# Patient Record
Sex: FEMALE | Race: WHITE | NOT HISPANIC OR LATINO
[De-identification: names, ages, dates, MRNs, and addresses within clinical notes are randomized per-mention and may not be internally consistent; named-entity substitution may affect disease eponyms.]

---

## 2018-06-03 ENCOUNTER — TRANSCRIPTION ENCOUNTER (OUTPATIENT)
Age: 53
End: 2018-06-03

## 2018-07-05 ENCOUNTER — TRANSCRIPTION ENCOUNTER (OUTPATIENT)
Age: 53
End: 2018-07-05

## 2018-10-19 ENCOUNTER — TRANSCRIPTION ENCOUNTER (OUTPATIENT)
Age: 53
End: 2018-10-19

## 2018-10-24 ENCOUNTER — TRANSCRIPTION ENCOUNTER (OUTPATIENT)
Age: 53
End: 2018-10-24

## 2018-12-08 ENCOUNTER — TRANSCRIPTION ENCOUNTER (OUTPATIENT)
Age: 53
End: 2018-12-08

## 2018-12-20 ENCOUNTER — TRANSCRIPTION ENCOUNTER (OUTPATIENT)
Age: 53
End: 2018-12-20

## 2019-01-25 ENCOUNTER — TRANSCRIPTION ENCOUNTER (OUTPATIENT)
Age: 54
End: 2019-01-25

## 2019-01-25 ENCOUNTER — NON-APPOINTMENT (OUTPATIENT)
Age: 54
End: 2019-01-25

## 2019-01-25 ENCOUNTER — APPOINTMENT (OUTPATIENT)
Dept: INTERNAL MEDICINE | Facility: CLINIC | Age: 54
End: 2019-01-25
Payer: COMMERCIAL

## 2019-01-25 VITALS
OXYGEN SATURATION: 99 % | HEIGHT: 61 IN | SYSTOLIC BLOOD PRESSURE: 102 MMHG | HEART RATE: 66 BPM | RESPIRATION RATE: 14 BRPM | TEMPERATURE: 97.6 F | WEIGHT: 125 LBS | BODY MASS INDEX: 23.6 KG/M2 | DIASTOLIC BLOOD PRESSURE: 64 MMHG

## 2019-01-25 PROCEDURE — 93000 ELECTROCARDIOGRAM COMPLETE: CPT

## 2019-01-25 PROCEDURE — 99386 PREV VISIT NEW AGE 40-64: CPT | Mod: 25

## 2019-01-25 NOTE — PHYSICAL EXAM

## 2019-01-25 NOTE — HEALTH RISK ASSESSMENT
[Very Good] : ~his/her~  mood as very good [] : No [No falls in past year] : Patient reported no falls in the past year [0] : 2) Feeling down, depressed, or hopeless: Not at all (0) [Patient reported mammogram was normal] : Patient reported mammogram was normal [Patient reported colonoscopy was normal] : Patient reported colonoscopy was normal [MammogramDate] : 08/17 [ColonoscopyDate] : 01/19

## 2019-01-29 LAB
25(OH)D3 SERPL-MCNC: 22.3 NG/ML
ALBUMIN SERPL ELPH-MCNC: 4.7 G/DL
ALP BLD-CCNC: 90 U/L
ALT SERPL-CCNC: 173 U/L
ANION GAP SERPL CALC-SCNC: 11 MMOL/L
APPEARANCE: CLEAR
AST SERPL-CCNC: 96 U/L
BACTERIA: NEGATIVE
BASOPHILS # BLD AUTO: 0.03 K/UL
BASOPHILS NFR BLD AUTO: 0.6 %
BILIRUB SERPL-MCNC: 0.5 MG/DL
BILIRUBIN URINE: NEGATIVE
BLOOD URINE: ABNORMAL
BUN SERPL-MCNC: 26 MG/DL
CALCIUM SERPL-MCNC: 9.7 MG/DL
CHLORIDE SERPL-SCNC: 103 MMOL/L
CHOLEST SERPL-MCNC: 219 MG/DL
CHOLEST/HDLC SERPL: 3.6 RATIO
CO2 SERPL-SCNC: 26 MMOL/L
COLOR: YELLOW
CREAT SERPL-MCNC: 1.01 MG/DL
EOSINOPHIL # BLD AUTO: 0.17 K/UL
EOSINOPHIL NFR BLD AUTO: 3.2 %
FOLATE SERPL-MCNC: 14.7 NG/ML
GLUCOSE QUALITATIVE U: NEGATIVE MG/DL
GLUCOSE SERPL-MCNC: 84 MG/DL
HBA1C MFR BLD HPLC: 5.2 %
HCT VFR BLD CALC: 42.9 %
HDLC SERPL-MCNC: 61 MG/DL
HGB BLD-MCNC: 14 G/DL
IMM GRANULOCYTES NFR BLD AUTO: 0.2 %
KETONES URINE: NEGATIVE
LDLC SERPL CALC-MCNC: 147 MG/DL
LEUKOCYTE ESTERASE URINE: NEGATIVE
LYMPHOCYTES # BLD AUTO: 1.75 K/UL
LYMPHOCYTES NFR BLD AUTO: 33.4 %
MAN DIFF?: NORMAL
MCHC RBC-ENTMCNC: 29.9 PG
MCHC RBC-ENTMCNC: 32.6 GM/DL
MCV RBC AUTO: 91.5 FL
MICROSCOPIC-UA: NORMAL
MONOCYTES # BLD AUTO: 0.57 K/UL
MONOCYTES NFR BLD AUTO: 10.9 %
NEUTROPHILS # BLD AUTO: 2.71 K/UL
NEUTROPHILS NFR BLD AUTO: 51.7 %
NITRITE URINE: NEGATIVE
PH URINE: 5.5
PLATELET # BLD AUTO: 243 K/UL
POTASSIUM SERPL-SCNC: 4.7 MMOL/L
PROT SERPL-MCNC: 7.5 G/DL
PROTEIN URINE: NEGATIVE MG/DL
RBC # BLD: 4.69 M/UL
RBC # FLD: 13.4 %
RED BLOOD CELLS URINE: 8 /HPF
SODIUM SERPL-SCNC: 140 MMOL/L
SPECIFIC GRAVITY URINE: 1.02
SQUAMOUS EPITHELIAL CELLS: 1 /HPF
TRIGL SERPL-MCNC: 57 MG/DL
TSH SERPL-ACNC: 3.18 UIU/ML
UROBILINOGEN URINE: NEGATIVE MG/DL
VIT B12 SERPL-MCNC: 604 PG/ML
WBC # FLD AUTO: 5.24 K/UL
WHITE BLOOD CELLS URINE: 1 /HPF

## 2019-01-30 ENCOUNTER — FORM ENCOUNTER (OUTPATIENT)
Age: 54
End: 2019-01-30

## 2019-01-31 ENCOUNTER — APPOINTMENT (OUTPATIENT)
Dept: ULTRASOUND IMAGING | Facility: CLINIC | Age: 54
End: 2019-01-31

## 2019-01-31 ENCOUNTER — OUTPATIENT (OUTPATIENT)
Dept: OUTPATIENT SERVICES | Facility: HOSPITAL | Age: 54
LOS: 1 days | End: 2019-01-31
Payer: COMMERCIAL

## 2019-01-31 DIAGNOSIS — Z00.8 ENCOUNTER FOR OTHER GENERAL EXAMINATION: ICD-10-CM

## 2019-01-31 LAB
ALBUMIN SERPL ELPH-MCNC: 4.3 G/DL
ALP BLD-CCNC: 82 U/L
ALT SERPL-CCNC: 132 U/L
ANION GAP SERPL CALC-SCNC: 11 MMOL/L
APPEARANCE: CLEAR
AST SERPL-CCNC: 67 U/L
BILIRUB SERPL-MCNC: 0.5 MG/DL
BILIRUBIN URINE: NEGATIVE
BLOOD URINE: NEGATIVE
BUN SERPL-MCNC: 19 MG/DL
CALCIUM SERPL-MCNC: 9.5 MG/DL
CHLORIDE SERPL-SCNC: 101 MMOL/L
CO2 SERPL-SCNC: 27 MMOL/L
COLOR: YELLOW
CREAT SERPL-MCNC: 0.8 MG/DL
GLUCOSE QUALITATIVE U: NEGATIVE MG/DL
GLUCOSE SERPL-MCNC: 93 MG/DL
HAV IGM SER QL: NONREACTIVE
HBV CORE IGM SER QL: NONREACTIVE
HBV SURFACE AG SER QL: NONREACTIVE
HCV AB SER QL: NONREACTIVE
HCV S/CO RATIO: 0.09 S/CO
KETONES URINE: NEGATIVE
LEUKOCYTE ESTERASE URINE: NEGATIVE
NITRITE URINE: NEGATIVE
PH URINE: 7
POTASSIUM SERPL-SCNC: 4.6 MMOL/L
PROT SERPL-MCNC: 7.2 G/DL
PROTEIN URINE: NEGATIVE MG/DL
SODIUM SERPL-SCNC: 139 MMOL/L
SPECIFIC GRAVITY URINE: 1.01
UROBILINOGEN URINE: NEGATIVE MG/DL

## 2019-01-31 PROCEDURE — 76700 US EXAM ABDOM COMPLETE: CPT | Mod: 26

## 2019-01-31 PROCEDURE — 76700 US EXAM ABDOM COMPLETE: CPT

## 2019-02-05 LAB — SMOOTH MUSCLE AB SER QL IF: ABNORMAL

## 2019-02-06 LAB
ANA PAT FLD IF-IMP: ABNORMAL
ANA SER IF-ACNC: ABNORMAL

## 2019-02-27 ENCOUNTER — APPOINTMENT (OUTPATIENT)
Dept: HEPATOLOGY | Facility: CLINIC | Age: 54
End: 2019-02-27

## 2019-03-05 ENCOUNTER — TRANSCRIPTION ENCOUNTER (OUTPATIENT)
Age: 54
End: 2019-03-05

## 2019-05-28 ENCOUNTER — APPOINTMENT (OUTPATIENT)
Dept: INTERNAL MEDICINE | Facility: CLINIC | Age: 54
End: 2019-05-28
Payer: COMMERCIAL

## 2019-05-28 ENCOUNTER — LABORATORY RESULT (OUTPATIENT)
Age: 54
End: 2019-05-28

## 2019-05-28 VITALS
RESPIRATION RATE: 14 BRPM | SYSTOLIC BLOOD PRESSURE: 100 MMHG | WEIGHT: 125 LBS | OXYGEN SATURATION: 99 % | DIASTOLIC BLOOD PRESSURE: 66 MMHG | BODY MASS INDEX: 23.6 KG/M2 | HEIGHT: 61 IN | TEMPERATURE: 98.1 F | HEART RATE: 52 BPM

## 2019-05-28 DIAGNOSIS — Z87.440 PERSONAL HISTORY OF URINARY (TRACT) INFECTIONS: ICD-10-CM

## 2019-05-28 LAB
ALBUMIN SERPL ELPH-MCNC: 4.4 G/DL
ALP BLD-CCNC: 104 U/L
ALT SERPL-CCNC: 24 U/L
ANION GAP SERPL CALC-SCNC: 8 MMOL/L
AST SERPL-CCNC: 15 U/L
BILIRUB SERPL-MCNC: 0.4 MG/DL
BILIRUB UR QL STRIP: NORMAL
BUN SERPL-MCNC: 14 MG/DL
CALCIUM SERPL-MCNC: 9.3 MG/DL
CHLORIDE SERPL-SCNC: 105 MMOL/L
CO2 SERPL-SCNC: 29 MMOL/L
CREAT SERPL-MCNC: 0.81 MG/DL
GLUCOSE SERPL-MCNC: 93 MG/DL
GLUCOSE UR-MCNC: NORMAL
HCG UR QL: 0.2 EU/DL
HGB UR QL STRIP.AUTO: NORMAL
KETONES UR-MCNC: NORMAL
LEUKOCYTE ESTERASE UR QL STRIP: NORMAL
NITRITE UR QL STRIP: NORMAL
PH UR STRIP: 6
POTASSIUM SERPL-SCNC: 5.2 MMOL/L
PROT SERPL-MCNC: 6.5 G/DL
PROT UR STRIP-MCNC: NORMAL
SODIUM SERPL-SCNC: 142 MMOL/L
SP GR UR STRIP: 1.01
TSH SERPL-ACNC: 1.85 UIU/ML

## 2019-05-28 PROCEDURE — 99214 OFFICE O/P EST MOD 30 MIN: CPT | Mod: 25

## 2019-05-28 PROCEDURE — 81003 URINALYSIS AUTO W/O SCOPE: CPT | Mod: QW

## 2019-05-28 NOTE — HISTORY OF PRESENT ILLNESS
[de-identified] : Pt here today for UTI symptoms\par Pt with urgency, frequency and odor in the urine\par Started 2 days ago.

## 2019-05-29 LAB
APPEARANCE: CLEAR
BILIRUBIN URINE: NEGATIVE
BLOOD URINE: NORMAL
COLOR: COLORLESS
GLUCOSE QUALITATIVE U: NEGATIVE
KETONES URINE: NEGATIVE
LEUKOCYTE ESTERASE URINE: ABNORMAL
NITRITE URINE: NEGATIVE
PH URINE: 6.5
PROTEIN URINE: NEGATIVE
SPECIFIC GRAVITY URINE: 1.01
UROBILINOGEN URINE: NORMAL

## 2019-12-18 ENCOUNTER — TRANSCRIPTION ENCOUNTER (OUTPATIENT)
Age: 54
End: 2019-12-18

## 2020-03-08 ENCOUNTER — TRANSCRIPTION ENCOUNTER (OUTPATIENT)
Age: 55
End: 2020-03-08

## 2020-07-08 ENCOUNTER — NON-APPOINTMENT (OUTPATIENT)
Age: 55
End: 2020-07-08

## 2020-07-08 ENCOUNTER — APPOINTMENT (OUTPATIENT)
Dept: INTERNAL MEDICINE | Facility: CLINIC | Age: 55
End: 2020-07-08
Payer: COMMERCIAL

## 2020-07-08 VITALS
TEMPERATURE: 98.6 F | OXYGEN SATURATION: 98 % | WEIGHT: 124 LBS | DIASTOLIC BLOOD PRESSURE: 76 MMHG | BODY MASS INDEX: 23.43 KG/M2 | HEART RATE: 57 BPM | SYSTOLIC BLOOD PRESSURE: 118 MMHG | RESPIRATION RATE: 14 BRPM

## 2020-07-08 PROCEDURE — 99396 PREV VISIT EST AGE 40-64: CPT | Mod: 25

## 2020-07-08 PROCEDURE — 93000 ELECTROCARDIOGRAM COMPLETE: CPT

## 2020-07-08 PROCEDURE — 36415 COLL VENOUS BLD VENIPUNCTURE: CPT

## 2020-07-08 RX ORDER — SULFAMETHOXAZOLE AND TRIMETHOPRIM 800; 160 MG/1; MG/1
800-160 TABLET ORAL
Qty: 20 | Refills: 0 | Status: DISCONTINUED | COMMUNITY
Start: 2018-12-20 | End: 2020-07-08

## 2020-07-08 RX ORDER — CEFUROXIME AXETIL 250 MG/1
250 TABLET ORAL
Qty: 10 | Refills: 0 | Status: DISCONTINUED | COMMUNITY
Start: 2019-05-28 | End: 2020-07-08

## 2020-07-08 RX ORDER — NITROFURANTOIN (MONOHYDRATE/MACROCRYSTALS) 25; 75 MG/1; MG/1
100 CAPSULE ORAL
Qty: 14 | Refills: 0 | Status: DISCONTINUED | COMMUNITY
Start: 2018-12-25 | End: 2020-07-08

## 2020-07-08 RX ORDER — ESCITALOPRAM OXALATE 5 MG/1
5 TABLET ORAL
Qty: 30 | Refills: 0 | Status: DISCONTINUED | COMMUNITY
Start: 2019-05-22 | End: 2020-07-08

## 2020-07-08 NOTE — PHYSICAL EXAM
[No Acute Distress] : no acute distress [Well Nourished] : well nourished [Well Developed] : well developed [Well-Appearing] : well-appearing [EOMI] : extraocular movements intact [Normal Sclera/Conjunctiva] : normal sclera/conjunctiva [PERRL] : pupils equal round and reactive to light [No JVD] : no jugular venous distention [Normal Oropharynx] : the oropharynx was normal [Normal Outer Ear/Nose] : the outer ears and nose were normal in appearance [No Lymphadenopathy] : no lymphadenopathy [Supple] : supple [No Respiratory Distress] : no respiratory distress  [No Accessory Muscle Use] : no accessory muscle use [Thyroid Normal, No Nodules] : the thyroid was normal and there were no nodules present [Normal Rate] : normal rate  [Clear to Auscultation] : lungs were clear to auscultation bilaterally [Normal S1, S2] : normal S1 and S2 [No Murmur] : no murmur heard [Regular Rhythm] : with a regular rhythm [No Carotid Bruits] : no carotid bruits [No Abdominal Bruit] : a ~M bruit was not heard ~T in the abdomen [No Varicosities] : no varicosities [Pedal Pulses Present] : the pedal pulses are present [No Extremity Clubbing/Cyanosis] : no extremity clubbing/cyanosis [No Edema] : there was no peripheral edema [No Palpable Aorta] : no palpable aorta [Soft] : abdomen soft [Non Tender] : non-tender [Non-distended] : non-distended [No Masses] : no abdominal mass palpated [No HSM] : no HSM [Normal Bowel Sounds] : normal bowel sounds [Normal Posterior Cervical Nodes] : no posterior cervical lymphadenopathy [Normal Anterior Cervical Nodes] : no anterior cervical lymphadenopathy [No Spinal Tenderness] : no spinal tenderness [No CVA Tenderness] : no CVA  tenderness [No Rash] : no rash [Grossly Normal Strength/Tone] : grossly normal strength/tone [No Joint Swelling] : no joint swelling [No Focal Deficits] : no focal deficits [Coordination Grossly Intact] : coordination grossly intact [Normal Gait] : normal gait [Normal Insight/Judgement] : insight and judgment were intact [Deep Tendon Reflexes (DTR)] : deep tendon reflexes were 2+ and symmetric [Normal Affect] : the affect was normal

## 2020-07-08 NOTE — HEALTH RISK ASSESSMENT
[Very Good] : ~his/her~  mood as very good [No falls in past year] : Patient reported no falls in the past year [0] : 2) Feeling down, depressed, or hopeless: Not at all (0) [MammogramDate] : 03/19 [Patient reported colonoscopy was normal] : Patient reported colonoscopy was normal [Patient reported mammogram was normal] : Patient reported mammogram was normal [ColonoscopyDate] : 01/19

## 2020-07-09 LAB
25(OH)D3 SERPL-MCNC: 34.1 NG/ML
ALBUMIN SERPL ELPH-MCNC: 5 G/DL
ALP BLD-CCNC: 84 U/L
ALT SERPL-CCNC: 24 U/L
ANION GAP SERPL CALC-SCNC: 12 MMOL/L
APPEARANCE: CLEAR
AST SERPL-CCNC: 24 U/L
BACTERIA: NEGATIVE
BASOPHILS # BLD AUTO: 0.05 K/UL
BASOPHILS NFR BLD AUTO: 1 %
BILIRUB SERPL-MCNC: 0.6 MG/DL
BILIRUBIN URINE: NEGATIVE
BLOOD URINE: NEGATIVE
BUN SERPL-MCNC: 13 MG/DL
CALCIUM SERPL-MCNC: 9.5 MG/DL
CHLORIDE SERPL-SCNC: 99 MMOL/L
CHOLEST SERPL-MCNC: 190 MG/DL
CHOLEST/HDLC SERPL: 3.2 RATIO
CO2 SERPL-SCNC: 26 MMOL/L
COLOR: COLORLESS
CREAT SERPL-MCNC: 1.03 MG/DL
EOSINOPHIL # BLD AUTO: 0.1 K/UL
EOSINOPHIL NFR BLD AUTO: 2 %
ESTIMATED AVERAGE GLUCOSE: 105 MG/DL
FOLATE SERPL-MCNC: 12.3 NG/ML
GLUCOSE QUALITATIVE U: NEGATIVE
GLUCOSE SERPL-MCNC: 93 MG/DL
HBA1C MFR BLD HPLC: 5.3 %
HCT VFR BLD CALC: 40.7 %
HDLC SERPL-MCNC: 60 MG/DL
HGB BLD-MCNC: 13.5 G/DL
HYALINE CASTS: 1 /LPF
IMM GRANULOCYTES NFR BLD AUTO: 0.2 %
KETONES URINE: NEGATIVE
LDLC SERPL CALC-MCNC: 117 MG/DL
LEUKOCYTE ESTERASE URINE: NEGATIVE
LYMPHOCYTES # BLD AUTO: 1.9 K/UL
LYMPHOCYTES NFR BLD AUTO: 37.8 %
MAN DIFF?: NORMAL
MCHC RBC-ENTMCNC: 30.7 PG
MCHC RBC-ENTMCNC: 33.2 GM/DL
MCV RBC AUTO: 92.5 FL
MICROSCOPIC-UA: NORMAL
MONOCYTES # BLD AUTO: 0.4 K/UL
MONOCYTES NFR BLD AUTO: 8 %
NEUTROPHILS # BLD AUTO: 2.57 K/UL
NEUTROPHILS NFR BLD AUTO: 51 %
NITRITE URINE: NEGATIVE
PH URINE: 6
PLATELET # BLD AUTO: 219 K/UL
POTASSIUM SERPL-SCNC: 5.2 MMOL/L
PROT SERPL-MCNC: 6.9 G/DL
PROTEIN URINE: NEGATIVE
RBC # BLD: 4.4 M/UL
RBC # FLD: 12.1 %
RED BLOOD CELLS URINE: 3 /HPF
SARS-COV-2 IGG SERPL IA-ACNC: <0.1 INDEX
SARS-COV-2 IGG SERPL QL IA: NEGATIVE
SODIUM SERPL-SCNC: 137 MMOL/L
SPECIFIC GRAVITY URINE: 1.01
SQUAMOUS EPITHELIAL CELLS: 1 /HPF
TRIGL SERPL-MCNC: 64 MG/DL
TSH SERPL-ACNC: 1.84 UIU/ML
UROBILINOGEN URINE: NORMAL
VIT B12 SERPL-MCNC: 445 PG/ML
WBC # FLD AUTO: 5.03 K/UL
WHITE BLOOD CELLS URINE: 1 /HPF

## 2020-08-14 ENCOUNTER — TRANSCRIPTION ENCOUNTER (OUTPATIENT)
Age: 55
End: 2020-08-14

## 2020-10-19 ENCOUNTER — APPOINTMENT (OUTPATIENT)
Dept: PEDIATRIC MEDICAL GENETICS | Facility: CLINIC | Age: 55
End: 2020-10-19
Payer: COMMERCIAL

## 2020-10-19 PROCEDURE — 99243 OFF/OP CNSLTJ NEW/EST LOW 30: CPT | Mod: GT

## 2020-12-21 PROBLEM — Z87.440 HISTORY OF URINARY TRACT INFECTION: Status: RESOLVED | Noted: 2019-05-28 | Resolved: 2020-12-21

## 2021-08-13 ENCOUNTER — NON-APPOINTMENT (OUTPATIENT)
Age: 56
End: 2021-08-13

## 2021-08-13 ENCOUNTER — APPOINTMENT (OUTPATIENT)
Dept: INTERNAL MEDICINE | Facility: CLINIC | Age: 56
End: 2021-08-13
Payer: COMMERCIAL

## 2021-08-13 VITALS
DIASTOLIC BLOOD PRESSURE: 70 MMHG | BODY MASS INDEX: 21.71 KG/M2 | SYSTOLIC BLOOD PRESSURE: 108 MMHG | RESPIRATION RATE: 14 BRPM | HEART RATE: 59 BPM | OXYGEN SATURATION: 98 % | TEMPERATURE: 97.9 F | WEIGHT: 115 LBS | HEIGHT: 61 IN

## 2021-08-13 DIAGNOSIS — R74.8 ABNORMAL LEVELS OF OTHER SERUM ENZYMES: ICD-10-CM

## 2021-08-13 PROCEDURE — 99396 PREV VISIT EST AGE 40-64: CPT | Mod: 25

## 2021-08-13 PROCEDURE — 93000 ELECTROCARDIOGRAM COMPLETE: CPT

## 2021-08-13 NOTE — HEALTH RISK ASSESSMENT
[Very Good] : ~his/her~  mood as very good [0] : 2) Feeling down, depressed, or hopeless: Not at all (0) [Patient reported mammogram was normal] : Patient reported mammogram was normal [Patient reported PAP Smear was normal] : Patient reported PAP Smear was normal [Patient reported colonoscopy was normal] : Patient reported colonoscopy was normal [MammogramDate] : 07/20 [MammogramComments] : appointment [PapSmearDate] : 08/20 [ColonoscopyDate] : 02/19

## 2021-08-18 DIAGNOSIS — E87.5 HYPERKALEMIA: ICD-10-CM

## 2021-08-18 LAB
25(OH)D3 SERPL-MCNC: 27.3 NG/ML
ALBUMIN SERPL ELPH-MCNC: 4.2 G/DL
ALP BLD-CCNC: 107 U/L
ALT SERPL-CCNC: 20 U/L
ANION GAP SERPL CALC-SCNC: 11 MMOL/L
APPEARANCE: CLEAR
AST SERPL-CCNC: 19 U/L
BACTERIA: NEGATIVE
BASOPHILS # BLD AUTO: 0.07 K/UL
BASOPHILS NFR BLD AUTO: 1.1 %
BILIRUB SERPL-MCNC: 0.2 MG/DL
BILIRUBIN URINE: NEGATIVE
BLOOD URINE: NORMAL
BUN SERPL-MCNC: 21 MG/DL
CALCIUM SERPL-MCNC: 9.1 MG/DL
CHLORIDE SERPL-SCNC: 105 MMOL/L
CHOLEST SERPL-MCNC: 190 MG/DL
CO2 SERPL-SCNC: 24 MMOL/L
COLOR: NORMAL
CREAT SERPL-MCNC: 1.07 MG/DL
EOSINOPHIL # BLD AUTO: 0.33 K/UL
EOSINOPHIL NFR BLD AUTO: 5.4 %
ESTIMATED AVERAGE GLUCOSE: 103 MG/DL
FOLATE SERPL-MCNC: 8.7 NG/ML
GLUCOSE QUALITATIVE U: NEGATIVE
GLUCOSE SERPL-MCNC: 82 MG/DL
HBA1C MFR BLD HPLC: 5.2 %
HCT VFR BLD CALC: 40.2 %
HDLC SERPL-MCNC: 59 MG/DL
HGB BLD-MCNC: 12.9 G/DL
HYALINE CASTS: 0 /LPF
IMM GRANULOCYTES NFR BLD AUTO: 0.2 %
KETONES URINE: NEGATIVE
LDLC SERPL CALC-MCNC: 106 MG/DL
LEUKOCYTE ESTERASE URINE: NEGATIVE
LYMPHOCYTES # BLD AUTO: 2.36 K/UL
LYMPHOCYTES NFR BLD AUTO: 38.5 %
MAN DIFF?: NORMAL
MCHC RBC-ENTMCNC: 30.6 PG
MCHC RBC-ENTMCNC: 32.1 GM/DL
MCV RBC AUTO: 95.3 FL
MICROSCOPIC-UA: NORMAL
MONOCYTES # BLD AUTO: 0.59 K/UL
MONOCYTES NFR BLD AUTO: 9.6 %
NEUTROPHILS # BLD AUTO: 2.77 K/UL
NEUTROPHILS NFR BLD AUTO: 45.2 %
NITRITE URINE: NEGATIVE
NONHDLC SERPL-MCNC: 131 MG/DL
PH URINE: 5.5
PLATELET # BLD AUTO: 251 K/UL
POTASSIUM SERPL-SCNC: 5.9 MMOL/L
PROT SERPL-MCNC: 6.3 G/DL
PROTEIN URINE: NEGATIVE
RBC # BLD: 4.22 M/UL
RBC # FLD: 12.1 %
RED BLOOD CELLS URINE: 3 /HPF
SODIUM SERPL-SCNC: 140 MMOL/L
SPECIFIC GRAVITY URINE: 1.02
SQUAMOUS EPITHELIAL CELLS: 0 /HPF
TRIGL SERPL-MCNC: 124 MG/DL
TSH SERPL-ACNC: 2.76 UIU/ML
UROBILINOGEN URINE: NORMAL
VIT B12 SERPL-MCNC: 387 PG/ML
WBC # FLD AUTO: 6.13 K/UL
WHITE BLOOD CELLS URINE: 0 /HPF

## 2021-08-19 LAB
ANION GAP SERPL CALC-SCNC: 11 MMOL/L
BUN SERPL-MCNC: 15 MG/DL
CALCIUM SERPL-MCNC: 9.2 MG/DL
CHLORIDE SERPL-SCNC: 102 MMOL/L
CO2 SERPL-SCNC: 25 MMOL/L
CREAT SERPL-MCNC: 0.9 MG/DL
GLUCOSE SERPL-MCNC: 86 MG/DL
POTASSIUM SERPL-SCNC: 4.8 MMOL/L
SODIUM SERPL-SCNC: 138 MMOL/L

## 2021-08-27 ENCOUNTER — APPOINTMENT (OUTPATIENT)
Dept: INTERNAL MEDICINE | Facility: CLINIC | Age: 56
End: 2021-08-27
Payer: COMMERCIAL

## 2021-08-27 VITALS
DIASTOLIC BLOOD PRESSURE: 72 MMHG | RESPIRATION RATE: 14 BRPM | HEIGHT: 61 IN | TEMPERATURE: 98 F | WEIGHT: 115 LBS | HEART RATE: 54 BPM | BODY MASS INDEX: 21.71 KG/M2 | SYSTOLIC BLOOD PRESSURE: 114 MMHG

## 2021-08-27 DIAGNOSIS — R39.15 URGENCY OF URINATION: ICD-10-CM

## 2021-08-27 LAB
BILIRUB UR QL STRIP: NORMAL
GLUCOSE UR-MCNC: NORMAL
HCG UR QL: 0.2 EU/DL
HGB UR QL STRIP.AUTO: NORMAL
KETONES UR-MCNC: NORMAL
LEUKOCYTE ESTERASE UR QL STRIP: NORMAL
NITRITE UR QL STRIP: NORMAL
PH UR STRIP: 5
PROT UR STRIP-MCNC: NORMAL
SP GR UR STRIP: 1.01

## 2021-08-27 PROCEDURE — 81003 URINALYSIS AUTO W/O SCOPE: CPT | Mod: QW

## 2021-08-27 PROCEDURE — 99213 OFFICE O/P EST LOW 20 MIN: CPT | Mod: 25

## 2021-08-27 NOTE — REVIEW OF SYSTEMS
[Dysuria] : dysuria [Incontinence] : no incontinence [Nocturia] : no nocturia [Poor Libido] : libido not poor [Hematuria] : no hematuria [Frequency] : no frequency [Vaginal Discharge] : no vaginal discharge [Dysmenorrhea] : no dysmenorrhea [Negative] : Respiratory

## 2021-08-28 LAB
APPEARANCE: CLEAR
BACTERIA: NEGATIVE
BILIRUBIN URINE: NEGATIVE
BLOOD URINE: NEGATIVE
COLOR: COLORLESS
GLUCOSE QUALITATIVE U: NEGATIVE
HYALINE CASTS: 0 /LPF
KETONES URINE: NEGATIVE
LEUKOCYTE ESTERASE URINE: NEGATIVE
MICROSCOPIC-UA: NORMAL
NITRITE URINE: NEGATIVE
PH URINE: 5.5
PROTEIN URINE: NEGATIVE
RED BLOOD CELLS URINE: 1 /HPF
SPECIFIC GRAVITY URINE: 1.01
SQUAMOUS EPITHELIAL CELLS: 1 /HPF
UROBILINOGEN URINE: NORMAL
WHITE BLOOD CELLS URINE: 0 /HPF

## 2021-08-30 LAB — BACTERIA UR CULT: NORMAL

## 2022-05-02 ENCOUNTER — APPOINTMENT (OUTPATIENT)
Dept: ORTHOPEDIC SURGERY | Facility: CLINIC | Age: 57
End: 2022-05-02

## 2022-09-22 ENCOUNTER — NON-APPOINTMENT (OUTPATIENT)
Age: 57
End: 2022-09-22

## 2022-09-22 ENCOUNTER — APPOINTMENT (OUTPATIENT)
Dept: INTERNAL MEDICINE | Facility: CLINIC | Age: 57
End: 2022-09-22

## 2022-09-22 VITALS
HEIGHT: 61 IN | HEART RATE: 54 BPM | SYSTOLIC BLOOD PRESSURE: 120 MMHG | WEIGHT: 120 LBS | RESPIRATION RATE: 16 BRPM | BODY MASS INDEX: 22.66 KG/M2 | TEMPERATURE: 97.5 F | OXYGEN SATURATION: 98 % | DIASTOLIC BLOOD PRESSURE: 78 MMHG

## 2022-09-22 DIAGNOSIS — Z11.59 ENCOUNTER FOR SCREENING FOR OTHER VIRAL DISEASES: ICD-10-CM

## 2022-09-22 PROCEDURE — 93000 ELECTROCARDIOGRAM COMPLETE: CPT

## 2022-09-22 PROCEDURE — 99396 PREV VISIT EST AGE 40-64: CPT | Mod: 25

## 2022-09-22 NOTE — HEALTH RISK ASSESSMENT
[Very Good] : ~his/her~  mood as very good [No falls in past year] : Patient reported no falls in the past year [0] : 2) Feeling down, depressed, or hopeless: Not at all (0) [Patient reported mammogram was normal] : Patient reported mammogram was normal [Patient reported colonoscopy was normal] : Patient reported colonoscopy was normal [MammogramDate] : 09//22 [ColonoscopyDate] : 02/19

## 2022-09-23 LAB
25(OH)D3 SERPL-MCNC: 27.9 NG/ML
ALBUMIN SERPL ELPH-MCNC: 4.4 G/DL
ALP BLD-CCNC: 93 U/L
ALT SERPL-CCNC: 17 U/L
ANION GAP SERPL CALC-SCNC: 12 MMOL/L
APPEARANCE: CLEAR
AST SERPL-CCNC: 17 U/L
BACTERIA: NEGATIVE
BASOPHILS # BLD AUTO: 0.06 K/UL
BASOPHILS NFR BLD AUTO: 0.7 %
BILIRUB SERPL-MCNC: 0.4 MG/DL
BILIRUBIN URINE: NEGATIVE
BLOOD URINE: NEGATIVE
BUN SERPL-MCNC: 21 MG/DL
CALCIUM SERPL-MCNC: 9.3 MG/DL
CHLORIDE SERPL-SCNC: 100 MMOL/L
CHOLEST SERPL-MCNC: 216 MG/DL
CO2 SERPL-SCNC: 25 MMOL/L
COLOR: COLORLESS
CREAT SERPL-MCNC: 1.25 MG/DL
EGFR: 50 ML/MIN/1.73M2
EOSINOPHIL # BLD AUTO: 0.1 K/UL
EOSINOPHIL NFR BLD AUTO: 1.1 %
ESTIMATED AVERAGE GLUCOSE: 108 MG/DL
FOLATE SERPL-MCNC: 12.7 NG/ML
GLUCOSE QUALITATIVE U: NEGATIVE
GLUCOSE SERPL-MCNC: 79 MG/DL
HBA1C MFR BLD HPLC: 5.4 %
HCT VFR BLD CALC: 40.3 %
HDLC SERPL-MCNC: 63 MG/DL
HGB BLD-MCNC: 13.3 G/DL
HYALINE CASTS: 0 /LPF
IMM GRANULOCYTES NFR BLD AUTO: 0.2 %
KETONES URINE: NEGATIVE
LDLC SERPL CALC-MCNC: 134 MG/DL
LEUKOCYTE ESTERASE URINE: NEGATIVE
LYMPHOCYTES # BLD AUTO: 3.06 K/UL
LYMPHOCYTES NFR BLD AUTO: 34.8 %
MAN DIFF?: NORMAL
MCHC RBC-ENTMCNC: 30.3 PG
MCHC RBC-ENTMCNC: 33 GM/DL
MCV RBC AUTO: 91.8 FL
MICROSCOPIC-UA: NORMAL
MONOCYTES # BLD AUTO: 0.53 K/UL
MONOCYTES NFR BLD AUTO: 6 %
NEUTROPHILS # BLD AUTO: 5.03 K/UL
NEUTROPHILS NFR BLD AUTO: 57.2 %
NITRITE URINE: NEGATIVE
NONHDLC SERPL-MCNC: 153 MG/DL
PH URINE: 7
PLATELET # BLD AUTO: 260 K/UL
POTASSIUM SERPL-SCNC: 4.3 MMOL/L
PROT SERPL-MCNC: 6.7 G/DL
PROTEIN URINE: NEGATIVE
RBC # BLD: 4.39 M/UL
RBC # FLD: 12.3 %
RED BLOOD CELLS URINE: 1 /HPF
SODIUM SERPL-SCNC: 137 MMOL/L
SPECIFIC GRAVITY URINE: 1.01
SQUAMOUS EPITHELIAL CELLS: 0 /HPF
TRIGL SERPL-MCNC: 94 MG/DL
TSH SERPL-ACNC: 2.02 UIU/ML
UROBILINOGEN URINE: NORMAL
VIT B12 SERPL-MCNC: 330 PG/ML
WBC # FLD AUTO: 8.8 K/UL
WHITE BLOOD CELLS URINE: 0 /HPF

## 2022-11-22 ENCOUNTER — NON-APPOINTMENT (OUTPATIENT)
Age: 57
End: 2022-11-22

## 2023-04-11 ENCOUNTER — APPOINTMENT (OUTPATIENT)
Dept: OTOLARYNGOLOGY | Facility: CLINIC | Age: 58
End: 2023-04-11
Payer: COMMERCIAL

## 2023-04-11 ENCOUNTER — NON-APPOINTMENT (OUTPATIENT)
Age: 58
End: 2023-04-11

## 2023-04-11 VITALS
WEIGHT: 125 LBS | BODY MASS INDEX: 23.6 KG/M2 | DIASTOLIC BLOOD PRESSURE: 75 MMHG | HEART RATE: 71 BPM | SYSTOLIC BLOOD PRESSURE: 115 MMHG | HEIGHT: 61 IN

## 2023-04-11 DIAGNOSIS — H93.11 TINNITUS, RIGHT EAR: ICD-10-CM

## 2023-04-11 DIAGNOSIS — H69.81 OTHER SPECIFIED DISORDERS OF EUSTACHIAN TUBE, RIGHT EAR: ICD-10-CM

## 2023-04-11 DIAGNOSIS — H81.12 BENIGN PAROXYSMAL VERTIGO, LEFT EAR: ICD-10-CM

## 2023-04-11 PROCEDURE — 92550 TYMPANOMETRY & REFLEX THRESH: CPT

## 2023-04-11 PROCEDURE — 95992 CANALITH REPOSITIONING PROC: CPT

## 2023-04-11 PROCEDURE — 92557 COMPREHENSIVE HEARING TEST: CPT

## 2023-04-11 PROCEDURE — 99204 OFFICE O/P NEW MOD 45 MIN: CPT | Mod: 25

## 2023-04-11 RX ORDER — MECLIZINE HYDROCHLORIDE 12.5 MG/1
12.5 TABLET ORAL
Qty: 90 | Refills: 3 | Status: ACTIVE | COMMUNITY
Start: 2023-04-11 | End: 1900-01-01

## 2023-04-11 NOTE — PHYSICAL EXAM
[Midline] : trachea located in midline position [Normal] : orientation to person, place, and time: normal [] : Tandem Romberg test is negative [de-identified] : head hanging left

## 2023-04-11 NOTE — REVIEW OF SYSTEMS
[Sneezing] : sneezing [Seasonal Allergies] : seasonal allergies [Post Nasal Drip] : post nasal drip [Dizziness] : dizziness [Vertigo] : vertigo [Lightheadedness] : lightheadedness [Negative] : Heme/Lymph

## 2023-04-11 NOTE — ASSESSMENT
[FreeTextEntry1] :   WNL w type A AU, ETD AU\par \par Epley performed\par MRI\par \par f/u after testing is complete or if vertigo recurs\par hold off on DCh until vertigo resolves

## 2023-04-11 NOTE — HISTORY OF PRESENT ILLNESS
[de-identified] : 57 yr old female c/o vertigo 4/9 when she got out of bed. That night, couldn't lie flat, worse when she rolled to the left.  Much worse today when she rolled over today at the chiropractor's today\par many years ago had vertigo for a few days\par c/o tinnitus AD since she had Covid 10/2021\par \par +herniated discs L3-4, had one epidural\par \par -hx otitis, noise exp, FH\par +head and neck trauma while in college, had surgery for herniated disc Cspine 2001

## 2023-04-14 ENCOUNTER — APPOINTMENT (OUTPATIENT)
Dept: OTOLARYNGOLOGY | Facility: CLINIC | Age: 58
End: 2023-04-14
Payer: COMMERCIAL

## 2023-04-14 VITALS
HEART RATE: 56 BPM | BODY MASS INDEX: 23.6 KG/M2 | DIASTOLIC BLOOD PRESSURE: 74 MMHG | WEIGHT: 125 LBS | HEIGHT: 61 IN | SYSTOLIC BLOOD PRESSURE: 119 MMHG

## 2023-04-14 DIAGNOSIS — R42 DIZZINESS AND GIDDINESS: ICD-10-CM

## 2023-04-14 PROCEDURE — 99214 OFFICE O/P EST MOD 30 MIN: CPT

## 2023-04-14 NOTE — ASSESSMENT
[FreeTextEntry1] : VNG\par MECLIZINE PRN\par BPV INSTRUCTIONS\par F/U AFTER VNG\par REPEAT EPLY MANEUVER OFFERED/ DISCUSSED

## 2023-04-14 NOTE — HISTORY OF PRESENT ILLNESS
[de-identified] : BPV FOLLOW UP\par FEELING BETTER BUT STILL HAS SOME SYMPTOMS\par NO FURTHER BPV SINCE EPLY DONE 3 DAYS AGO\par MEDICAL HX REVIEWED

## 2023-05-09 ENCOUNTER — APPOINTMENT (OUTPATIENT)
Dept: OTOLARYNGOLOGY | Facility: CLINIC | Age: 58
End: 2023-05-09

## 2023-06-23 ENCOUNTER — NON-APPOINTMENT (OUTPATIENT)
Age: 58
End: 2023-06-23

## 2023-09-26 ENCOUNTER — APPOINTMENT (OUTPATIENT)
Dept: INTERNAL MEDICINE | Facility: CLINIC | Age: 58
End: 2023-09-26
Payer: COMMERCIAL

## 2023-09-26 ENCOUNTER — NON-APPOINTMENT (OUTPATIENT)
Age: 58
End: 2023-09-26

## 2023-09-26 VITALS
WEIGHT: 128 LBS | TEMPERATURE: 98.6 F | BODY MASS INDEX: 24.17 KG/M2 | DIASTOLIC BLOOD PRESSURE: 76 MMHG | HEIGHT: 61 IN | OXYGEN SATURATION: 98 % | RESPIRATION RATE: 16 BRPM | SYSTOLIC BLOOD PRESSURE: 110 MMHG | HEART RATE: 50 BPM

## 2023-09-26 DIAGNOSIS — Z00.00 ENCOUNTER FOR GENERAL ADULT MEDICAL EXAMINATION W/OUT ABNORMAL FINDINGS: ICD-10-CM

## 2023-09-26 PROCEDURE — 99396 PREV VISIT EST AGE 40-64: CPT | Mod: 25

## 2023-09-26 PROCEDURE — 93000 ELECTROCARDIOGRAM COMPLETE: CPT

## 2023-09-27 LAB
25(OH)D3 SERPL-MCNC: 37.3 NG/ML
ALBUMIN SERPL ELPH-MCNC: 4.5 G/DL
ALP BLD-CCNC: 112 U/L
ALT SERPL-CCNC: 18 U/L
ANION GAP SERPL CALC-SCNC: 10 MMOL/L
APPEARANCE: CLEAR
AST SERPL-CCNC: 20 U/L
BACTERIA: NEGATIVE /HPF
BILIRUB SERPL-MCNC: 0.3 MG/DL
BILIRUBIN URINE: NEGATIVE
BLOOD URINE: NEGATIVE
BUN SERPL-MCNC: 14 MG/DL
CALCIUM SERPL-MCNC: 9.6 MG/DL
CAST: 0 /LPF
CHLORIDE SERPL-SCNC: 102 MMOL/L
CHOLEST SERPL-MCNC: 216 MG/DL
CO2 SERPL-SCNC: 26 MMOL/L
COLOR: YELLOW
CREAT SERPL-MCNC: 0.93 MG/DL
EGFR: 70 ML/MIN/1.73M2
EPITHELIAL CELLS: 0 /HPF
ESTIMATED AVERAGE GLUCOSE: 114 MG/DL
FOLATE SERPL-MCNC: 14.4 NG/ML
GLUCOSE QUALITATIVE U: NEGATIVE MG/DL
GLUCOSE SERPL-MCNC: 91 MG/DL
HBA1C MFR BLD HPLC: 5.6 %
HDLC SERPL-MCNC: 66 MG/DL
KETONES URINE: NEGATIVE MG/DL
LDLC SERPL CALC-MCNC: 140 MG/DL
LEUKOCYTE ESTERASE URINE: NEGATIVE
MICROSCOPIC-UA: NORMAL
NITRITE URINE: NEGATIVE
NONHDLC SERPL-MCNC: 151 MG/DL
PH URINE: 6.5
POTASSIUM SERPL-SCNC: 5 MMOL/L
PROT SERPL-MCNC: 6.7 G/DL
PROTEIN URINE: NEGATIVE MG/DL
RED BLOOD CELLS URINE: 1 /HPF
SODIUM SERPL-SCNC: 137 MMOL/L
SPECIFIC GRAVITY URINE: 1.01
TRIGL SERPL-MCNC: 63 MG/DL
TSH SERPL-ACNC: 2.53 UIU/ML
UROBILINOGEN URINE: 0.2 MG/DL
VIT B12 SERPL-MCNC: 438 PG/ML
WHITE BLOOD CELLS URINE: 0 /HPF

## 2023-10-03 LAB — HEMOCCULT STL QL IA: NEGATIVE

## 2023-10-14 ENCOUNTER — NON-APPOINTMENT (OUTPATIENT)
Age: 58
End: 2023-10-14

## 2024-07-17 ENCOUNTER — NON-APPOINTMENT (OUTPATIENT)
Age: 59
End: 2024-07-17

## 2024-09-27 ENCOUNTER — NON-APPOINTMENT (OUTPATIENT)
Age: 59
End: 2024-09-27

## 2024-09-27 ENCOUNTER — APPOINTMENT (OUTPATIENT)
Dept: INTERNAL MEDICINE | Facility: CLINIC | Age: 59
End: 2024-09-27
Payer: COMMERCIAL

## 2024-09-27 VITALS
TEMPERATURE: 97.9 F | WEIGHT: 125 LBS | HEART RATE: 55 BPM | DIASTOLIC BLOOD PRESSURE: 64 MMHG | RESPIRATION RATE: 16 BRPM | HEIGHT: 61 IN | BODY MASS INDEX: 23.6 KG/M2 | OXYGEN SATURATION: 97 % | SYSTOLIC BLOOD PRESSURE: 108 MMHG

## 2024-09-27 DIAGNOSIS — Z00.00 ENCOUNTER FOR GENERAL ADULT MEDICAL EXAMINATION W/OUT ABNORMAL FINDINGS: ICD-10-CM

## 2024-09-27 DIAGNOSIS — R92.30 DENSE BREASTS, UNSPECIFIED: ICD-10-CM

## 2024-09-27 PROCEDURE — 99396 PREV VISIT EST AGE 40-64: CPT

## 2024-09-27 NOTE — HEALTH RISK ASSESSMENT
[Good] : ~his/her~  mood as  good [No falls in past year] : Patient reported no falls in the past year [0] : 2) Feeling down, depressed, or hopeless: Not at all (0) [[id number]] : 0 [Patient reported mammogram was normal] : Patient reported mammogram was normal [Patient reported PAP Smear was normal] : Patient reported PAP Smear was normal [Patient reported colonoscopy was normal] : Patient reported colonoscopy was normal [MammogramDate] : 09/24 [PapSmearDate] : 11/23 [ColonoscopyDate] : 2019 [ColonoscopyComments] : due and with appt

## 2024-10-01 LAB
25(OH)D3 SERPL-MCNC: 25.6 NG/ML
ALBUMIN SERPL ELPH-MCNC: 4.2 G/DL
ALP BLD-CCNC: 101 U/L
ALT SERPL-CCNC: 20 U/L
ANION GAP SERPL CALC-SCNC: 13 MMOL/L
APPEARANCE: CLEAR
AST SERPL-CCNC: 20 U/L
BACTERIA: NEGATIVE /HPF
BASOPHILS # BLD AUTO: 0.06 K/UL
BASOPHILS NFR BLD AUTO: 0.7 %
BILIRUB SERPL-MCNC: 0.5 MG/DL
BILIRUBIN URINE: NEGATIVE
BLOOD URINE: NEGATIVE
BUN SERPL-MCNC: 20 MG/DL
CALCIUM SERPL-MCNC: 9.2 MG/DL
CAST: 0 /LPF
CHLORIDE SERPL-SCNC: 98 MMOL/L
CHOLEST SERPL-MCNC: 234 MG/DL
CO2 SERPL-SCNC: 25 MMOL/L
COLOR: YELLOW
CREAT SERPL-MCNC: 0.9 MG/DL
EGFR: 73 ML/MIN/1.73M2
EOSINOPHIL # BLD AUTO: 0.12 K/UL
EOSINOPHIL NFR BLD AUTO: 1.3 %
EPITHELIAL CELLS: 0 /HPF
ESTIMATED AVERAGE GLUCOSE: 108 MG/DL
FOLATE SERPL-MCNC: 12.3 NG/ML
GLUCOSE QUALITATIVE U: NEGATIVE MG/DL
GLUCOSE SERPL-MCNC: 81 MG/DL
HBA1C MFR BLD HPLC: 5.4 %
HCT VFR BLD CALC: 38.7 %
HDLC SERPL-MCNC: 62 MG/DL
HGB BLD-MCNC: 12.4 G/DL
IMM GRANULOCYTES NFR BLD AUTO: 0.2 %
KETONES URINE: NEGATIVE MG/DL
LDLC SERPL CALC-MCNC: 159 MG/DL
LEUKOCYTE ESTERASE URINE: ABNORMAL
LYMPHOCYTES # BLD AUTO: 3.16 K/UL
LYMPHOCYTES NFR BLD AUTO: 35 %
MAN DIFF?: NORMAL
MCHC RBC-ENTMCNC: 30.4 PG
MCHC RBC-ENTMCNC: 32 GM/DL
MCV RBC AUTO: 94.9 FL
MICROSCOPIC-UA: NORMAL
MONOCYTES # BLD AUTO: 0.65 K/UL
MONOCYTES NFR BLD AUTO: 7.2 %
NEUTROPHILS # BLD AUTO: 5.02 K/UL
NEUTROPHILS NFR BLD AUTO: 55.6 %
NITRITE URINE: NEGATIVE
NONHDLC SERPL-MCNC: 172 MG/DL
PH URINE: 6
PLATELET # BLD AUTO: 263 K/UL
POTASSIUM SERPL-SCNC: 5 MMOL/L
PROT SERPL-MCNC: 6.7 G/DL
PROTEIN URINE: NEGATIVE MG/DL
RBC # BLD: 4.08 M/UL
RBC # FLD: 12.8 %
RED BLOOD CELLS URINE: 1 /HPF
SODIUM SERPL-SCNC: 137 MMOL/L
SPECIFIC GRAVITY URINE: 1.01
TRIGL SERPL-MCNC: 80 MG/DL
TSH SERPL-ACNC: 2.35 UIU/ML
UROBILINOGEN URINE: 0.2 MG/DL
VIT B12 SERPL-MCNC: 410 PG/ML
WBC # FLD AUTO: 9.03 K/UL
WHITE BLOOD CELLS URINE: 0 /HPF

## 2024-10-08 ENCOUNTER — OUTPATIENT (OUTPATIENT)
Dept: OUTPATIENT SERVICES | Facility: HOSPITAL | Age: 59
LOS: 1 days | End: 2024-10-08
Payer: COMMERCIAL

## 2024-10-08 ENCOUNTER — APPOINTMENT (OUTPATIENT)
Dept: RADIOLOGY | Facility: CLINIC | Age: 59
End: 2024-10-08
Payer: COMMERCIAL

## 2024-10-08 DIAGNOSIS — Z00.00 ENCOUNTER FOR GENERAL ADULT MEDICAL EXAMINATION WITHOUT ABNORMAL FINDINGS: ICD-10-CM

## 2024-10-08 PROCEDURE — 77080 DXA BONE DENSITY AXIAL: CPT

## 2024-10-08 PROCEDURE — 77080 DXA BONE DENSITY AXIAL: CPT | Mod: 26

## 2024-11-06 ENCOUNTER — OUTPATIENT (OUTPATIENT)
Dept: OUTPATIENT SERVICES | Facility: HOSPITAL | Age: 59
LOS: 1 days | End: 2024-11-06

## 2024-11-06 DIAGNOSIS — Z00.8 ENCOUNTER FOR OTHER GENERAL EXAMINATION: ICD-10-CM

## 2024-11-08 ENCOUNTER — OUTPATIENT (OUTPATIENT)
Dept: OUTPATIENT SERVICES | Facility: HOSPITAL | Age: 59
LOS: 1 days | End: 2024-11-08
Payer: COMMERCIAL

## 2024-11-08 ENCOUNTER — APPOINTMENT (OUTPATIENT)
Dept: MRI IMAGING | Facility: CLINIC | Age: 59
End: 2024-11-08

## 2024-11-08 DIAGNOSIS — Z00.8 ENCOUNTER FOR OTHER GENERAL EXAMINATION: ICD-10-CM

## 2024-11-08 PROCEDURE — 72148 MRI LUMBAR SPINE W/O DYE: CPT

## 2024-11-16 ENCOUNTER — NON-APPOINTMENT (OUTPATIENT)
Age: 59
End: 2024-11-16

## 2025-04-18 ENCOUNTER — NON-APPOINTMENT (OUTPATIENT)
Age: 60
End: 2025-04-18

## 2025-04-23 ENCOUNTER — APPOINTMENT (OUTPATIENT)
Dept: INTERNAL MEDICINE | Facility: CLINIC | Age: 60
End: 2025-04-23
Payer: COMMERCIAL

## 2025-04-23 VITALS
HEIGHT: 61 IN | WEIGHT: 125 LBS | HEART RATE: 76 BPM | TEMPERATURE: 98.1 F | BODY MASS INDEX: 23.6 KG/M2 | RESPIRATION RATE: 15 BRPM | SYSTOLIC BLOOD PRESSURE: 110 MMHG | OXYGEN SATURATION: 97 % | DIASTOLIC BLOOD PRESSURE: 76 MMHG

## 2025-04-23 DIAGNOSIS — E78.5 HYPERLIPIDEMIA, UNSPECIFIED: ICD-10-CM

## 2025-04-23 DIAGNOSIS — R74.8 ABNORMAL LEVELS OF OTHER SERUM ENZYMES: ICD-10-CM

## 2025-04-23 PROCEDURE — G2211 COMPLEX E/M VISIT ADD ON: CPT | Mod: NC

## 2025-04-23 PROCEDURE — 99213 OFFICE O/P EST LOW 20 MIN: CPT

## 2025-04-24 PROBLEM — E78.5 HYPERLIPIDEMIA: Status: ACTIVE | Noted: 2025-04-24

## 2025-04-24 LAB
25(OH)D3 SERPL-MCNC: 28.3 NG/ML
ALBUMIN SERPL ELPH-MCNC: 4.4 G/DL
ALP BLD-CCNC: 95 U/L
ALT SERPL-CCNC: 48 U/L
ANION GAP SERPL CALC-SCNC: 13 MMOL/L
AST SERPL-CCNC: 32 U/L
BASOPHILS # BLD AUTO: 0.06 K/UL
BASOPHILS NFR BLD AUTO: 0.8 %
BILIRUB SERPL-MCNC: 0.4 MG/DL
BUN SERPL-MCNC: 18 MG/DL
CALCIUM SERPL-MCNC: 9.7 MG/DL
CHLORIDE SERPL-SCNC: 101 MMOL/L
CHOLEST SERPL-MCNC: 255 MG/DL
CO2 SERPL-SCNC: 25 MMOL/L
CREAT SERPL-MCNC: 0.91 MG/DL
EGFRCR SERPLBLD CKD-EPI 2021: 72 ML/MIN/1.73M2
EOSINOPHIL # BLD AUTO: 0.17 K/UL
EOSINOPHIL NFR BLD AUTO: 2.3 %
ESTIMATED AVERAGE GLUCOSE: 114 MG/DL
GLUCOSE SERPL-MCNC: 94 MG/DL
HBA1C MFR BLD HPLC: 5.6 %
HCT VFR BLD CALC: 41.3 %
HDLC SERPL-MCNC: 58 MG/DL
HGB BLD-MCNC: 13.3 G/DL
IMM GRANULOCYTES NFR BLD AUTO: 0.1 %
LDLC SERPL-MCNC: 179 MG/DL
LYMPHOCYTES # BLD AUTO: 2.33 K/UL
LYMPHOCYTES NFR BLD AUTO: 31.8 %
MAN DIFF?: NORMAL
MCHC RBC-ENTMCNC: 29.9 PG
MCHC RBC-ENTMCNC: 32.2 G/DL
MCV RBC AUTO: 92.8 FL
MONOCYTES # BLD AUTO: 0.56 K/UL
MONOCYTES NFR BLD AUTO: 7.7 %
NEUTROPHILS # BLD AUTO: 4.19 K/UL
NEUTROPHILS NFR BLD AUTO: 57.3 %
NONHDLC SERPL-MCNC: 197 MG/DL
PLATELET # BLD AUTO: 348 K/UL
POTASSIUM SERPL-SCNC: 5.1 MMOL/L
PROT SERPL-MCNC: 7 G/DL
RBC # BLD: 4.45 M/UL
RBC # FLD: 13 %
SODIUM SERPL-SCNC: 138 MMOL/L
TRIGL SERPL-MCNC: 103 MG/DL
TSH SERPL-ACNC: 1.57 UIU/ML
WBC # FLD AUTO: 7.32 K/UL

## 2025-04-24 RX ORDER — ATORVASTATIN CALCIUM 10 MG/1
10 TABLET, FILM COATED ORAL DAILY
Qty: 90 | Refills: 1 | Status: ACTIVE | COMMUNITY
Start: 2025-04-24 | End: 1900-01-01

## 2025-06-24 RX ORDER — SIMVASTATIN 10 MG/1
10 TABLET, FILM COATED ORAL
Qty: 90 | Refills: 1 | Status: ACTIVE | COMMUNITY
Start: 2025-06-24 | End: 1900-01-01